# Patient Record
Sex: MALE | Race: WHITE | NOT HISPANIC OR LATINO | Employment: OTHER | URBAN - METROPOLITAN AREA
[De-identification: names, ages, dates, MRNs, and addresses within clinical notes are randomized per-mention and may not be internally consistent; named-entity substitution may affect disease eponyms.]

---

## 2022-05-07 ENCOUNTER — HOSPITAL ENCOUNTER (EMERGENCY)
Facility: HOSPITAL | Age: 72
Discharge: HOME | End: 2022-05-07
Attending: EMERGENCY MEDICINE

## 2022-05-07 VITALS
TEMPERATURE: 98 F | RESPIRATION RATE: 20 BRPM | OXYGEN SATURATION: 96 % | HEART RATE: 73 BPM | SYSTOLIC BLOOD PRESSURE: 133 MMHG | DIASTOLIC BLOOD PRESSURE: 89 MMHG

## 2022-05-07 DIAGNOSIS — R33.8 URINARY RETENTION DUE TO BENIGN PROSTATIC HYPERPLASIA: Primary | ICD-10-CM

## 2022-05-07 DIAGNOSIS — N40.1 URINARY RETENTION DUE TO BENIGN PROSTATIC HYPERPLASIA: Primary | ICD-10-CM

## 2022-05-07 DIAGNOSIS — E87.6 ACUTE HYPOKALEMIA: ICD-10-CM

## 2022-05-07 DIAGNOSIS — R33.9 URINARY RETENTION: ICD-10-CM

## 2022-05-07 LAB
ALBUMIN SERPL-MCNC: 3.9 GM/DL (ref 3.4–4.8)
ALBUMIN/GLOB SERPL: 1.4 RATIO (ref 1.1–2)
ALP SERPL-CCNC: 47 UNIT/L (ref 40–150)
ALT SERPL-CCNC: 15 UNIT/L (ref 0–55)
APPEARANCE UR: CLEAR
AST SERPL-CCNC: 23 UNIT/L (ref 5–34)
BACTERIA #/AREA URNS AUTO: ABNORMAL /HPF
BASOPHILS # BLD AUTO: 0.02 X10(3)/MCL (ref 0–0.2)
BASOPHILS NFR BLD AUTO: 0.3 %
BILIRUB UR QL STRIP.AUTO: NEGATIVE MG/DL
BILIRUBIN DIRECT+TOT PNL SERPL-MCNC: 0.4 MG/DL (ref 0–0.5)
BILIRUBIN DIRECT+TOT PNL SERPL-MCNC: 0.5 MG/DL (ref 0–0.8)
BILIRUBIN DIRECT+TOT PNL SERPL-MCNC: 0.9 MG/DL
BUN SERPL-MCNC: 18.1 MG/DL (ref 8.4–25.7)
CALCIUM SERPL-MCNC: 9.4 MG/DL (ref 8.8–10)
CHLORIDE SERPL-SCNC: 102 MMOL/L (ref 98–107)
CO2 SERPL-SCNC: 25 MMOL/L (ref 23–31)
COLOR UR AUTO: YELLOW
CREAT SERPL-MCNC: 1.13 MG/DL (ref 0.73–1.18)
EOSINOPHIL # BLD AUTO: 0.01 X10(3)/MCL (ref 0–0.9)
EOSINOPHIL NFR BLD AUTO: 0.1 %
ERYTHROCYTE [DISTWIDTH] IN BLOOD BY AUTOMATED COUNT: 13.6 % (ref 11.5–17)
GLOBULIN SER-MCNC: 2.8 GM/DL (ref 2.4–3.5)
GLUCOSE SERPL-MCNC: 147 MG/DL (ref 82–115)
GLUCOSE UR QL STRIP.AUTO: NEGATIVE MG/DL
HCT VFR BLD AUTO: 41.2 % (ref 42–52)
HGB BLD-MCNC: 13.8 GM/DL (ref 14–18)
IMM GRANULOCYTES # BLD AUTO: 0.02 X10(3)/MCL (ref 0–0.02)
IMM GRANULOCYTES NFR BLD AUTO: 0.3 % (ref 0–0.43)
KETONES UR QL STRIP.AUTO: NEGATIVE MG/DL
LEUKOCYTE ESTERASE UR QL STRIP.AUTO: NEGATIVE UNIT/L
LYMPHOCYTES # BLD AUTO: 0.62 X10(3)/MCL (ref 0.6–4.6)
LYMPHOCYTES NFR BLD AUTO: 9 %
MCH RBC QN AUTO: 28.3 PG (ref 27–31)
MCHC RBC AUTO-ENTMCNC: 33.5 MG/DL (ref 33–36)
MCV RBC AUTO: 84.6 FL (ref 80–94)
MONOCYTES # BLD AUTO: 0.36 X10(3)/MCL (ref 0.1–1.3)
MONOCYTES NFR BLD AUTO: 5.2 %
NEUTROPHILS # BLD AUTO: 5.8 X10(3)/MCL (ref 2.1–9.2)
NEUTROPHILS NFR BLD AUTO: 85.1 %
NITRITE UR QL STRIP.AUTO: NEGATIVE
NRBC BLD AUTO-RTO: 0 %
PH UR STRIP.AUTO: 5.5 [PH]
PLATELET # BLD AUTO: 179 X10(3)/MCL (ref 130–400)
PMV BLD AUTO: 9.8 FL (ref 9.4–12.4)
POTASSIUM SERPL-SCNC: 3 MMOL/L (ref 3.5–5.1)
PROT SERPL-MCNC: 6.7 GM/DL (ref 5.8–7.6)
PROT UR QL STRIP.AUTO: NEGATIVE MG/DL
RBC # BLD AUTO: 4.87 X10(6)/MCL (ref 4.7–6.1)
RBC #/AREA URNS AUTO: ABNORMAL /HPF
RBC UR QL AUTO: ABNORMAL UNIT/L
SODIUM SERPL-SCNC: 141 MMOL/L (ref 136–145)
SP GR UR STRIP.AUTO: 1.01 (ref 1–1.03)
SQUAMOUS #/AREA URNS AUTO: ABNORMAL /LPF
UROBILINOGEN UR STRIP-ACNC: 0.2 MG/DL
WBC # SPEC AUTO: 6.9 X10(3)/MCL (ref 4.5–11.5)
WBC #/AREA URNS AUTO: ABNORMAL /HPF

## 2022-05-07 PROCEDURE — 51798 US URINE CAPACITY MEASURE: CPT

## 2022-05-07 PROCEDURE — 85025 COMPLETE CBC W/AUTO DIFF WBC: CPT | Performed by: EMERGENCY MEDICINE

## 2022-05-07 PROCEDURE — 99283 EMERGENCY DEPT VISIT LOW MDM: CPT | Mod: 25

## 2022-05-07 PROCEDURE — 81015 MICROSCOPIC EXAM OF URINE: CPT | Performed by: EMERGENCY MEDICINE

## 2022-05-07 PROCEDURE — 51702 INSERT TEMP BLADDER CATH: CPT

## 2022-05-07 PROCEDURE — 36415 COLL VENOUS BLD VENIPUNCTURE: CPT | Performed by: EMERGENCY MEDICINE

## 2022-05-07 PROCEDURE — 80053 COMPREHEN METABOLIC PANEL: CPT | Performed by: EMERGENCY MEDICINE

## 2022-05-07 PROCEDURE — 25000003 PHARM REV CODE 250: Performed by: EMERGENCY MEDICINE

## 2022-05-07 PROCEDURE — 81003 URINALYSIS AUTO W/O SCOPE: CPT | Performed by: EMERGENCY MEDICINE

## 2022-05-07 RX ORDER — POTASSIUM CHLORIDE 20 MEQ/1
40 TABLET, EXTENDED RELEASE ORAL ONCE
Status: COMPLETED | OUTPATIENT
Start: 2022-05-07 | End: 2022-05-07

## 2022-05-07 RX ADMIN — POTASSIUM CHLORIDE 40 MEQ: 20 TABLET, EXTENDED RELEASE ORAL at 10:05

## 2022-05-07 NOTE — DISCHARGE INSTRUCTIONS
Call your doctor to follow up when you return home.  You will need to be re-evaluated and have catheter exchanged some time in 2 weeks-1 month (sooner if needed for any issues)

## 2022-05-07 NOTE — ED PROVIDER NOTES
Encounter Date: 5/7/2022    SCRIBE #1 NOTE: I, Jeanie Aiken , am scribing for, and in the presence of,  Dr. Annette Hall. I have scribed the following portions of the note - Other sections scribed: HPI, ROS, PE.       History     Chief Complaint   Patient presents with    Urinary Retention     Last void 10pm, pt denies history of urinary retetntion, lower abdominal tension     72 y/o male with a history of HTN presents to the ED c/o urinary retention with associated abdominal discomfort since around 2200 yesterday. Pt states he does have a history of having an enlarged prostate, which was followed by a urologist for several years. He notes he has had mild difficulties in the past with urination, but never to the extent of being unable to urinate. Pt states he typically has to urinate two times during the night. He notes sitting and lying down are both uncomfortable positions since the onset of his symptoms. He states he is from the [facility] and passing through [place] on a road trip and will be returning home in about 2 months time. Pt denies any constipation, dysuria, or recent changes in diet. He also denies any past abdominal surgeries. Pt notes he is allergic to Levofloxacin.    The history is provided by the patient and the spouse.   Male  Problem  Primary symptoms include no dysuria. Primary symptoms comment: urinary retention. This is a new problem. The current episode started yesterday. The problem occurs constantly. The problem has been unchanged. Associated symptoms include abdominal pain. Pertinent negatives include no diaphoresis, no nausea, no vomiting, no constipation and no diarrhea. He has tried nothing for the symptoms.     Review of patient's allergies indicates:  No Known Allergies  Past Medical History:   Diagnosis Date    Hypertension     Mixed hyperlipidemia      No past surgical history on file.  No family history on file.  Social History     Tobacco Use    Smoking status: Never  Smoker   Substance Use Topics    Alcohol use: Yes     Alcohol/week: 14.0 standard drinks     Types: 7 Glasses of wine, 7 Standard drinks or equivalent per week    Drug use: Never     Review of Systems   Constitutional: Negative for chills, diaphoresis and fever.   HENT: Negative for congestion, ear pain, sinus pain and sore throat.    Eyes: Negative for pain, discharge and visual disturbance.   Respiratory: Negative for cough, shortness of breath, wheezing and stridor.    Cardiovascular: Negative for chest pain and palpitations.   Gastrointestinal: Positive for abdominal pain. Negative for constipation, diarrhea, nausea, rectal pain and vomiting.   Genitourinary: Positive for difficulty urinating (complete inability to urinate). Negative for dysuria and hematuria.   Musculoskeletal: Negative for back pain and myalgias.   Skin: Negative for rash.   Neurological: Negative for dizziness, syncope, numbness and headaches.   Hematological: Negative.    Psychiatric/Behavioral: Negative.    All other systems reviewed and are negative.      Physical Exam     Initial Vitals [05/07/22 0734]   BP Pulse Resp Temp SpO2   (!) 159/100 89 20 98.2 °F (36.8 °C) 99 %      MAP       --         Physical Exam    Nursing note and vitals reviewed.  Constitutional: No distress.   HENT:   Head: Normocephalic and atraumatic.   Eyes: Conjunctivae and EOM are normal. Pupils are equal, round, and reactive to light.   Neck: Trachea normal. Neck supple.   Normal range of motion.  Cardiovascular: Normal rate and regular rhythm.   No murmur heard.  Pulmonary/Chest: Breath sounds normal. No respiratory distress. He exhibits no tenderness.   Abdominal: Abdomen is soft. Bowel sounds are normal. He exhibits distension. There is abdominal tenderness in the suprapubic area.   Musculoskeletal:         General: Normal range of motion.      Cervical back: Normal range of motion and neck supple.      Lumbar back: Normal. No tenderness. Normal range of  motion.     Neurological: He is alert and oriented to person, place, and time. He has normal strength. No cranial nerve deficit or sensory deficit.   Skin: Skin is warm and dry.   Psychiatric: He has a normal mood and affect.         ED Course   Procedures  Labs Reviewed   COMPREHENSIVE METABOLIC PANEL - Abnormal; Notable for the following components:       Result Value    Potassium Level 3.0 (*)     Glucose Level 147 (*)     All other components within normal limits   URINALYSIS - Abnormal; Notable for the following components:    Blood, UA Trace (*)     All other components within normal limits   CBC WITH DIFFERENTIAL - Abnormal; Notable for the following components:    Hgb 13.8 (*)     Hct 41.2 (*)     IG# 0.02 (*)     All other components within normal limits   CBC W/ AUTO DIFFERENTIAL    Narrative:     The following orders were created for panel order CBC auto differential.  Procedure                               Abnormality         Status                     ---------                               -----------         ------                     CBC with Differential[[id number]]        Abnormal            Final result                 Please view results for these tests on the individual orders.   URINALYSIS, MICROSCOPIC          Imaging Results    None          Medications   potassium chloride SA CR tablet 40 mEq (40 mEq Oral Given 5/7/22 1015)     Medical Decision Making:   History:   I obtained history from: someone other than patient.  Clinical Tests:   Lab Tests: Ordered and Reviewed  The following lab test(s) were unremarkable: CBC, CMP and Urinalysis  ED Management:  Bladder scan and indwelling cooney placement          Scribe Attestation:   Scribe #1: I performed the above scribed service and the documentation accurately describes the services I performed. I attest to the accuracy of the note.    Attending Attestation:           Physician Attestation for Scribe:  Physician Attestation Statement for Scribe  #1: I, reviewed documentation, as scribed by Jeanie Aiken  in my presence, and it is both accurate and complete.             ED Course as of 05/07/22 1121   Sat May 07, 2022   0843 Retaining 550 ml on bladder scan, indwelling catheter placed with 550 output, pain improved [BS]   1004 Updated on current plan, feels much better [BS]   1053 Awaiting urine result for dc [BS]      ED Course User Index  [BS] Annette Hall MD             Clinical Impression:   Final diagnoses:  [N40.1, R33.8] Urinary retention due to benign prostatic hyperplasia (Primary)  [R33.9] Urinary retention  [E87.6] Acute hypokalemia          ED Disposition Condition    Discharge Stable        ED Prescriptions     None        Follow-up Information     Follow up With Specialties Details Why Contact Info    [facility] - Emergency Dept Emergency Medicine  As needed, If symptoms worsen [address] [facility] [id number] [phone]    Shaquille Velasco MD Urology Call  As needed [address] [facility]  [address]  [facility] [address]  [phone]             Annette Hall MD  05/07/22 1121